# Patient Record
Sex: MALE | Race: WHITE | Employment: FULL TIME | URBAN - METROPOLITAN AREA
[De-identification: names, ages, dates, MRNs, and addresses within clinical notes are randomized per-mention and may not be internally consistent; named-entity substitution may affect disease eponyms.]

---

## 2021-03-01 ENCOUNTER — OFFICE VISIT (OUTPATIENT)
Dept: SURGERY | Age: 56
End: 2021-03-01
Payer: COMMERCIAL

## 2021-03-01 VITALS
HEIGHT: 71 IN | OXYGEN SATURATION: 100 % | WEIGHT: 287 LBS | DIASTOLIC BLOOD PRESSURE: 60 MMHG | BODY MASS INDEX: 40.18 KG/M2 | SYSTOLIC BLOOD PRESSURE: 116 MMHG | HEART RATE: 77 BPM

## 2021-03-01 DIAGNOSIS — G47.33 OBSTRUCTIVE SLEEP APNEA SYNDROME: ICD-10-CM

## 2021-03-01 DIAGNOSIS — Z79.4 TYPE 2 DIABETES MELLITUS WITHOUT COMPLICATION, WITH LONG-TERM CURRENT USE OF INSULIN (HCC): ICD-10-CM

## 2021-03-01 DIAGNOSIS — K21.9 GASTROESOPHAGEAL REFLUX DISEASE, UNSPECIFIED WHETHER ESOPHAGITIS PRESENT: ICD-10-CM

## 2021-03-01 DIAGNOSIS — Z98.84 HISTORY OF ADJUSTABLE GASTRIC BANDING: ICD-10-CM

## 2021-03-01 DIAGNOSIS — E66.01 MORBID OBESITY (HCC): Primary | ICD-10-CM

## 2021-03-01 DIAGNOSIS — E11.9 TYPE 2 DIABETES MELLITUS WITHOUT COMPLICATION, WITH LONG-TERM CURRENT USE OF INSULIN (HCC): ICD-10-CM

## 2021-03-01 DIAGNOSIS — E66.01 MORBID OBESITY WITH BMI OF 40.0-44.9, ADULT (HCC): ICD-10-CM

## 2021-03-01 PROCEDURE — 99245 OFF/OP CONSLTJ NEW/EST HI 55: CPT | Performed by: NURSE PRACTITIONER

## 2021-03-01 RX ORDER — CITALOPRAM 20 MG/1
20 TABLET, FILM COATED ORAL DAILY
COMMUNITY
Start: 2021-01-11

## 2021-03-01 RX ORDER — PEN NEEDLE, DIABETIC 30 GX3/16"
NEEDLE, DISPOSABLE MISCELLANEOUS
COMMUNITY
Start: 2020-08-10

## 2021-03-01 RX ORDER — ATORVASTATIN CALCIUM 40 MG/1
40 TABLET, FILM COATED ORAL DAILY
COMMUNITY
Start: 2020-05-13

## 2021-03-01 RX ORDER — INSULIN GLARGINE 100 [IU]/ML
70 INJECTION, SOLUTION SUBCUTANEOUS
COMMUNITY
Start: 2020-05-13

## 2021-03-01 RX ORDER — DILTIAZEM HYDROCHLORIDE 120 MG/1
120 CAPSULE, EXTENDED RELEASE ORAL DAILY
COMMUNITY
Start: 2020-05-13

## 2021-03-01 RX ORDER — CHOLESTYRAMINE 4 G/4.8G
1 POWDER, FOR SUSPENSION ORAL DAILY
COMMUNITY

## 2021-03-01 NOTE — PROGRESS NOTES
Subjective:     [place]  is a 63 y.o. male who presents for follow-up about 12 years following laparoscopic adjustable gastric band surgery by Dr Lissett Cruz. He has lost a total of 33 pounds since surgery. Body mass index is 40.03 kg/m². The patient presents today to assess their progress toward their goal of weight loss and to address any issues that may be present. Today the patient and I have reviewed their diet and how appropriate their food choices are. The following issues have been identified - none from a surgical standpoint. He comes in today to establish care and is concerned about placement of his band following recent CT imaging. Has had a year of diarrhea since having lap cholecystectomy and someone suggested having his lap band evaluated. Being followed by GI. Denies any problems with his lap band. His last fill was 7/28/10 and has 3.6mL in his band. He weighed 258 lbs at that time (35% EBWL). Weight Loss Metrics 3/1/2021 4/26/2019 8/10/2018 6/18/2018 4/20/2016 4/14/2016 4/10/2013   Today's Wt 287 lb 292 lb 302 lb 297 lb 280 lb 283 lb 280 lb   BMI 40.03 kg/m2 40.73 kg/m2 42.12 kg/m2 41.42 kg/m2 39.07 kg/m2 39.49 kg/m2 39.07 kg/m2       Surgery related complication: NA       He reports diarrhea and denies vomiting, abdominal pain, nausea and reflux. Patients pain score:0/10    Weight Loss Metrics 3/1/2021 4/26/2019 8/10/2018 6/18/2018 4/20/2016 4/14/2016 4/10/2013   Today's Wt 287 lb 292 lb 302 lb 297 lb 280 lb 283 lb 280 lb   BMI 40.03 kg/m2 40.73 kg/m2 42.12 kg/m2 41.42 kg/m2 39.07 kg/m2 39.49 kg/m2 39.07 kg/m2        The patient's exercise level: not active. Changes in his medical history and medications have been reviewed.     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.89    Neuropathy G62.9    Migraines G43.909    Hyperlipidemia E78.5    GERD (gastroesophageal reflux disease) K21.9    Diabetes (HCC) E11.9    Depression F32.9    CAD (coronary artery disease) I25.10    Atrial fibrillation (HCC) I48.91    Sleep apnea G47.30     Past Medical History:   Diagnosis Date    Atrial fibrillation (HCC)     CAD (coronary artery disease)     Depression     Diabetes ([facility] [address]     Essential hypertension, benign 4/11/2013    GERD (gastroesophageal reflux disease)     Hyperlipidemia     Migraines     Neuropathy     Sleep apnea     not using CPAP     Past Surgical History:   Procedure Laterality Date    HX AFIB ABLATION      HX CATARACT REMOVAL      HX KNEE ARTHROSCOPY      HX LAP CHOLECYSTECTOMY      HX SHOULDER ARTHROSCOPY      [place] GASTRIC BAND FILL  2009    Dr Jay Zheng     Current Outpatient Medications   Medication Sig Dispense Refill    apixaban (ELIQUIS) 5 mg tablet Take 5 mg by mouth two (2) times a day.  cholestyramine-aspartame (QUESTRAN LIGHT) 4 gram packet Take 1 Packet by mouth daily.  citalopram (CELEXA) 20 mg tablet Take 20 mg by mouth daily.  dilTIAZem ER (TIAZAC) 120 mg capsule Take 120 mg by mouth daily.  insulin glargine (LANTUS,BASAGLAR) 100 unit/mL (3 mL) inpn 70 Units by SubCUTAneous route.  Insulin Needles, Disposable, 31 gauge x 5/16\" ndle INJECT 1 EACH BY MISC ROUTE ONCE A DAY. CHECK BLOOD SUGAR BEFORE BREAKFAST, LUNCH, AND DINNER.  atorvastatin (LIPITOR) 40 mg tablet Take 40 mg by mouth daily.  peppermint oil 90 mg CECX Take  by mouth.  VITAMIN D2 50,000 unit capsule   5    JANUMET XR 50-1,000 mg TM24   5    FARXIGA 10 mg tab   5    gabapentin (NEURONTIN) 400 mg capsule   3    omeprazole (PRILOSEC) 20 mg capsule   5    rivaroxaban (XARELTO) 20 mg tab tablet Take  by mouth daily.  butalbital-acetaminophen (PHRENILIN)  mg tablet Take  by mouth.  glipiZIDE (GLUCOTROL) 10 mg tablet Take 10 mg by mouth two (2) times a day.  pioglitazone (ACTOS) 45 mg tablet Take  by mouth daily.  atorvastatin (LIPITOR) 40 mg tablet Take  by mouth daily.  ezetimibe (ZETIA) 10 mg tablet Take  by mouth.  multivitamin (ONE A DAY) tablet Take 1 Tab by mouth daily.           Review of Symptoms:       General - No history or complaints of unexpected fever or chills  Head/Neck - No history or complaints of headache or dizziness  Cardiac - No history or complaints of chest pain, palpitations, or shortness of breath  Pulmonary - No history or complaints of shortness of breath or productive cough  Gastrointestinal - as noted above  Genitourinary - No history or complaints of hematuria/dysuria or renal lithiasis  Musculoskeletal - No history or complaints of joint  muscular weakness  Hematologic - No history of any bleeding episodes  Neurologic - No history or complaints of  migraine headaches or neurologic symptoms                     Objective:     Visit Vitals  /60 (BP 1 Location: Left upper arm, BP Patient Position: Sitting, BP Cuff Size: Large adult)   Pulse 77   Ht 5' 11\" (1.803 m)   Wt 130.2 kg (287 lb)   SpO2 100%   BMI 40.03 kg/m²        Physical Exam:      General appearance:  alert, cooperative, no distress, appears stated age   Mental status   alert, oriented to person, place, and time   Neck  supple, no significant adenopathy     Lymphatics  no palpable lymphadenopathy, no hepatosplenomegaly   Chest  clear to auscultation, no wheezes, rales or rhonchi, symmetric air entry   Heart  normal rate, regular rhythm, normal S1, S2, no murmurs, rubs, clicks or gallops    Abdomen: soft, nontender, nondistended, no masses or organomegaly   Incision: Well healed      Neurological  alert, oriented, normal speech, no focal findings or movement disorder noted   Musculoskeletal no joint tenderness, deformity or swelling   Extremities peripheral pulses normal, no pedal edema, no clubbing or cyanosis   Skin normal coloration and turgor, no rashes, no suspicious skin lesions noted     Lab Results   Component Value Date/Time    HGB 14.2 09/25/2018 04:28 PM    HCT 43.7 09/25/2018 04:28 PM     Lab Results   Component Value Date/Time    Bilirubin, total 0.7 09/25/2018 04:28 PM    Alk. phosphatase 53 09/25/2018 04:28 PM    Protein, total 6.6 09/25/2018 04:28 PM    Albumin 4.4 09/25/2018 04:28 PM    ALT (SGPT) 58 (H) 09/25/2018 04:28 PM     No results found for: IRON, FE, TIBC, IBCT, PSAT, FERR  No results found for: FOL, RBCF  No results found for: BALTAZAR Quiles      2/3/21 AND CT reviewed in [facility]  1. Mild colonic wall thickening of the mid and distal sigmoid colon. This is nonspecific and may be related to degree of underdistention. Nevertheless, given patient's stated age, correlation with colonoscopic examination if not already performed should be considered. 2. Hepatic steatosis. 3. Status post cholecystectomy. 4. Gastric lap band remains in place and appropriately positioned with reservoir in the adipose tissues of the anterior mid abdomen just right of midline. No mechanical bowel obstruction. Assessment:     1. History of Morbid obesity, status post  laparoscopic adjustable gastric band surgery. The patient seems to be doing well from a gastric banding standpoint. Not having any signs or symptoms of too much or too little restriction. He has had some weight gain since last UGI more than 10 years ago. No adjustment at that time and has a total is appears of 3.6mL of fluid. We will schedule him for UGI in fluoroscopy clinic to evaluate band placement. He has GI f/up scheduled to continue to evaluate and manage chronic diarrhea since cholecystectomy with EGD and colonoscopy scheduled in next few weeks. .     Plan:     1.  Remember to measure portions, continue low carbohydrate diet  2. Continue to concentrate on protein intake meeting daily requirements  3. Remember vitamin supplements. The importance of such was discussed regarding the malabsorptive issues that the surgery creates. 4. Exercise regimen appears to be: inadequate, needs 150 minutes cardio weekly  5. Try and attend support group if feasible. 6. Follow-up as scheduled for UGI. 7. Lab reviewed and appropriate changes made. 8. Total time spent with the patient 30 minutes.

## 2021-03-31 ENCOUNTER — APPOINTMENT (OUTPATIENT)
Dept: GENERAL RADIOLOGY | Age: 56
End: 2021-03-31
Attending: SPECIALIST
Payer: COMMERCIAL

## 2021-03-31 ENCOUNTER — HOSPITAL ENCOUNTER (OUTPATIENT)
Age: 56
Setting detail: OUTPATIENT SURGERY
Discharge: HOME | End: 2021-03-31
Attending: SPECIALIST | Admitting: SPECIALIST
Payer: COMMERCIAL

## 2021-03-31 ENCOUNTER — APPOINTMENT (OUTPATIENT)
Dept: SURGERY | Age: 56
End: 2021-03-31

## 2021-03-31 VITALS
HEART RATE: 88 BPM | SYSTOLIC BLOOD PRESSURE: 137 MMHG | WEIGHT: 280.19 LBS | OXYGEN SATURATION: 96 % | DIASTOLIC BLOOD PRESSURE: 76 MMHG | BODY MASS INDEX: 39.23 KG/M2 | TEMPERATURE: 97.7 F | HEIGHT: 71 IN | RESPIRATION RATE: 20 BRPM

## 2021-03-31 DIAGNOSIS — R13.10 DYSPHAGIA, UNSPECIFIED TYPE: ICD-10-CM

## 2021-03-31 DIAGNOSIS — E66.01 MORBID OBESITY (HCC): ICD-10-CM

## 2021-03-31 PROCEDURE — 74240 X-RAY XM UPR GI TRC 1CNTRST: CPT | Performed by: SPECIALIST

## 2021-03-31 PROCEDURE — 74240 X-RAY XM UPR GI TRC 1CNTRST: CPT

## 2021-03-31 PROCEDURE — 74011000250 HC RX REV CODE- 250: Performed by: SPECIALIST

## 2021-03-31 PROCEDURE — 76040000019: Performed by: SPECIALIST

## 2021-03-31 NOTE — PROCEDURES
Lap Band Encounter (fluroscopy clinic)    Jude Jc is gastric banding patient who had his procedure on  .  his weight today is 127.1 kg (280 lb 3 oz), which correlates to  % EBW loss. he is here today for Upper GI Study. he notes the following issues related to the banding procedure; - long term banding pt via alex gibbons with adjustments via our office. Here today with some dysphagia and other GI issues. He is here at the urging of her PCP to ensure the band is not a factor.       Surgery related complications; alex gibbons pt    Visit Vitals  /76   Pulse 88   Temp 97.7 °F (36.5 °C)   Resp 20   Ht 5' 11\" (1.803 m)   Wt 127.1 kg (280 lb 3 oz)   SpO2 96%   BMI 39.08 kg/m²       Past Medical History:   Diagnosis Date    Atrial fibrillation (HCC)     CAD (coronary artery disease)     Depression     Diabetes ([facility] [address]     Essential hypertension, benign 4/11/2013    GERD (gastroesophageal reflux disease)     Hyperlipidemia     Migraines     Neuropathy     Sleep apnea     not using CPAP     Past Surgical History:   Procedure Laterality Date    HX AFIB ABLATION      HX CATARACT REMOVAL      HX KNEE ARTHROSCOPY      HX LAP CHOLECYSTECTOMY      HX SHOULDER ARTHROSCOPY      [place] GASTRIC BAND FILL  2009    Dr Nely Hollingsworth     Current Facility-Administered Medications   Medication Dose Route Frequency Provider Last Rate Last Admin    barium sulfate (EZ PAQUE) 60 % (w/v) contrast susp    PRN David President, MD   100 mL at 03/31/21 1516          Review of Symptoms:     General - No history or complaints of unexpected fever or chills  Cardiac - No history or complaints of chest pain, palpitations, or shortness of breath  Pulmonary - No history or complaints of shortness of breath or productive cough  Gastrointestinal - as noted above        Physical Exam:    General:  alert, cooperative, no distress, appears stated age   Abdomen:   abdomen is soft without significant tenderness, masses, organomegaly or guarding; port in place   Incisions: healing well, no significant drainage       Assessment:     1. History of Morbid obesity, status post gastric banding, given the fluro findings of a properly oriented band that is not too tight we will proceed with the following adjustment. Plan:     Previous Fill Volume:     Removed: Total fill volume after today's adjustment:    Added:           no adjustment today - he may consider having his band removed at a later time with possible conversion to another more aggressive procedure.        Follow-up in PRN

## 2022-03-19 PROBLEM — E66.01 MORBID OBESITY WITH BMI OF 40.0-44.9, ADULT (HCC): Status: ACTIVE | Noted: 2021-03-01

## 2022-03-19 PROBLEM — E66.01 MORBID OBESITY (HCC): Status: ACTIVE | Noted: 2021-03-01

## 2022-03-19 PROBLEM — Z98.84 HISTORY OF ADJUSTABLE GASTRIC BANDING: Status: ACTIVE | Noted: 2021-03-01

## 2023-05-26 ENCOUNTER — OFFICE VISIT (OUTPATIENT)
Age: 58
End: 2023-05-26
Payer: COMMERCIAL

## 2023-05-26 VITALS
BODY MASS INDEX: 40.12 KG/M2 | WEIGHT: 286.6 LBS | OXYGEN SATURATION: 99 % | HEART RATE: 72 BPM | TEMPERATURE: 97 F | DIASTOLIC BLOOD PRESSURE: 47 MMHG | HEIGHT: 71 IN | SYSTOLIC BLOOD PRESSURE: 112 MMHG

## 2023-05-26 DIAGNOSIS — K31.89 RETAINED FOOD IN STOMACH: Primary | ICD-10-CM

## 2023-05-26 DIAGNOSIS — R11.2 NAUSEA AND VOMITING, UNSPECIFIED VOMITING TYPE: ICD-10-CM

## 2023-05-26 PROCEDURE — 99214 OFFICE O/P EST MOD 30 MIN: CPT | Performed by: SPECIALIST

## 2023-05-26 PROCEDURE — 3078F DIAST BP <80 MM HG: CPT | Performed by: SPECIALIST

## 2023-05-26 PROCEDURE — 3074F SYST BP LT 130 MM HG: CPT | Performed by: SPECIALIST

## 2023-05-26 PROCEDURE — S2083 ADJUSTMENT GASTRIC BAND: HCPCS | Performed by: SPECIALIST

## 2023-05-26 RX ORDER — AMITRIPTYLINE HYDROCHLORIDE 25 MG/1
TABLET, FILM COATED ORAL
COMMUNITY
Start: 2023-05-12

## 2023-05-26 RX ORDER — NITROGLYCERIN 0.4 MG/1
TABLET SUBLINGUAL
COMMUNITY
Start: 2023-04-20

## 2023-05-26 RX ORDER — PANTOPRAZOLE SODIUM 40 MG/1
40 TABLET, DELAYED RELEASE ORAL DAILY
COMMUNITY
Start: 2023-05-09

## 2023-05-26 RX ORDER — DILTIAZEM HYDROCHLORIDE 120 MG/1
CAPSULE, COATED, EXTENDED RELEASE ORAL DAILY
COMMUNITY
Start: 2023-03-28

## 2023-05-26 RX ORDER — FUROSEMIDE 40 MG/1
40 TABLET ORAL DAILY
COMMUNITY
Start: 2023-03-28

## 2023-05-26 RX ORDER — PROCHLORPERAZINE 25 MG/1
SUPPOSITORY RECTAL
COMMUNITY
Start: 2023-03-28

## 2023-05-26 RX ORDER — ATORVASTATIN CALCIUM 80 MG/1
80 TABLET, FILM COATED ORAL DAILY
COMMUNITY
Start: 2023-04-20

## 2023-05-26 RX ORDER — BUTALBITAL, ACETAMINOPHEN, CAFFEINE AND CODEINE PHOSPHATE 50; 325; 40; 30 MG/1; MG/1; MG/1; MG/1
CAPSULE ORAL DAILY
COMMUNITY
Start: 2023-05-11

## 2023-05-26 RX ORDER — CLOPIDOGREL BISULFATE 75 MG/1
75 TABLET ORAL DAILY
COMMUNITY
Start: 2023-03-28

## 2023-05-26 ASSESSMENT — PATIENT HEALTH QUESTIONNAIRE - PHQ9
SUM OF ALL RESPONSES TO PHQ QUESTIONS 1-9: 0
SUM OF ALL RESPONSES TO PHQ9 QUESTIONS 1 & 2: 0
SUM OF ALL RESPONSES TO PHQ QUESTIONS 1-9: 0
2. FEELING DOWN, DEPRESSED OR HOPELESS: 0
1. LITTLE INTEREST OR PLEASURE IN DOING THINGS: 0
SUM OF ALL RESPONSES TO PHQ QUESTIONS 1-9: 0
SUM OF ALL RESPONSES TO PHQ QUESTIONS 1-9: 0

## 2023-06-05 NOTE — PROGRESS NOTES
successful.        - Moderately severe esophagitis with no bleeding.        - An adjustable gastric banding was found, characterized by severe stenosis. - Normal examined duodenum. Estimated Blood Loss:        Estimated blood loss was minimal.     Recommendation:        - Patient has a contact number available for emergencies. The signs and symptoms of potential delayed complications were discussed with the patient. Return to normal activities tomorrow. Written discharge instructions were provided to the patient. - Discharge patient to home (ambulatory). - Resume previous diet. - Continue present medications.        - Repeat upper endoscopy PRN for retreatment. - Return to GI clinic PRN. - Remove dysfunctioning lap band     Plan:     Remember to measure portions, continue low carbohydrate diet  See smart set for plan  Remember vitamin supplements. The importance of such was discussed regarding the malabsorptive issues that the surgery creates. Exercise regimen appears to be: non-existent   Try and attend support group if feasible. Follow-up for band removal after he gets cleared by cardiology. Lab reviewed and appropriate changes made. Total time spent with the patient 30 minutes. A1C 11.7 LAST WEEK    1.5 CC REMOVED FROM BAND TODAY     NEEDS CARDIAC CLEARANCE VIA DR. Erna Badillo MD